# Patient Record
Sex: FEMALE | Race: WHITE | NOT HISPANIC OR LATINO | URBAN - NONMETROPOLITAN AREA
[De-identification: names, ages, dates, MRNs, and addresses within clinical notes are randomized per-mention and may not be internally consistent; named-entity substitution may affect disease eponyms.]

---

## 2022-01-19 ENCOUNTER — APPOINTMENT (OUTPATIENT)
Dept: URBAN - NONMETROPOLITAN AREA CLINIC 54 | Age: 57
Setting detail: DERMATOLOGY
End: 2022-01-20

## 2022-01-19 DIAGNOSIS — L65.0 TELOGEN EFFLUVIUM: ICD-10-CM

## 2022-01-19 PROCEDURE — OTHER PRESCRIPTION MEDICATION MANAGEMENT: OTHER

## 2022-01-19 PROCEDURE — OTHER MIPS QUALITY: OTHER

## 2022-01-19 PROCEDURE — OTHER ADDITIONAL NOTES: OTHER

## 2022-01-19 PROCEDURE — 99203 OFFICE O/P NEW LOW 30 MIN: CPT

## 2022-01-19 PROCEDURE — OTHER COUNSELING: OTHER

## 2022-01-19 ASSESSMENT — LOCATION SIMPLE DESCRIPTION DERM: LOCATION SIMPLE: FRONTAL SCALP

## 2022-01-19 ASSESSMENT — LOCATION ZONE DERM: LOCATION ZONE: SCALP

## 2022-01-19 ASSESSMENT — LOCATION DETAILED DESCRIPTION DERM: LOCATION DETAILED: MEDIAL FRONTAL SCALP

## 2022-01-19 NOTE — PROCEDURE: PRESCRIPTION MEDICATION MANAGEMENT
Render In Strict Bullet Format?: No
Detail Level: Zone
Initiate Treatment: Shampoo every 2 or 3 days, avoid harsh chemicals, add biotin supplement.\\nMay start Rogaine 5% to speed up the process of regrowth

## 2022-01-19 NOTE — HPI: HAIR LOSS
Previous Labs: Yes
How Did The Hair Loss Occur?: sudden in onset
How Severe Is Your Hair Loss?: severe
Additional History: Pt had Covid-19 6 months ago, 2 months later her hair started falling out in clumps.
Lab Details: PCP checked thyroid, was normal

## 2022-01-19 NOTE — PROCEDURE: ADDITIONAL NOTES
Detail Level: Simple
Render Risk Assessment In Note?: no
Additional Notes: Patient suffered from Covid-19 6 months ago. She states that she was bedridden x 1 month and lost approximately 20 pounds (125 lbs down to 105 lbs). Patient had routine bloodwork completed by her PCP recently that was WNL. \\n\\nI suspect TE. New hair regrowth noted along the temporal scalp. Recommended biotin supplementation and minoxidil in order to speed up recovery. Managed patient expectations regarding rate of improvement.